# Patient Record
Sex: FEMALE | Race: OTHER
[De-identification: names, ages, dates, MRNs, and addresses within clinical notes are randomized per-mention and may not be internally consistent; named-entity substitution may affect disease eponyms.]

---

## 2017-01-19 ENCOUNTER — HOSPITAL ENCOUNTER (EMERGENCY)
Dept: HOSPITAL 36 - ER | Age: 67
Discharge: HOME | End: 2017-01-19
Payer: MEDICAID

## 2017-01-19 VITALS — SYSTOLIC BLOOD PRESSURE: 205 MMHG | DIASTOLIC BLOOD PRESSURE: 92 MMHG

## 2017-01-19 DIAGNOSIS — E78.5: ICD-10-CM

## 2017-01-19 DIAGNOSIS — E11.65: ICD-10-CM

## 2017-01-19 DIAGNOSIS — I10: ICD-10-CM

## 2017-01-19 DIAGNOSIS — N39.0: Primary | ICD-10-CM

## 2017-01-19 DIAGNOSIS — E78.00: ICD-10-CM

## 2017-01-19 LAB
ANION GAP SERPL CALC-SCNC: 5.2 MMOL/L (ref 7–16)
BACTERIA #/AREA URNS HPF: (no result) /HPF
BASOPHILS NFR BLD AUTO: 0.6 % (ref 0–2)
BILIRUB UR-MCNC: NEGATIVE MG/DL
BUN SERPL-MCNC: 11 MG/DL (ref 7–25)
BUN/CREAT SERPL: 15.7
CALCIUM SERPL-MCNC: 9 MG/DL (ref 8.6–10.3)
CHLORIDE SERPL-SCNC: 97 MEQ/L (ref 98–107)
CO2 SERPL-SCNC: 26.9 MEQ/L (ref 21–31)
COLOR UR: YELLOW
CREAT SERPL-MCNC: 0.7 MG/DL (ref 0.6–1.2)
EOSINOPHIL NFR BLD AUTO: 0.7 % (ref 0–5)
EPI CELLS URNS QL MICRO: (no result) /LPF
ERYTHROCYTE [DISTWIDTH] IN BLOOD BY AUTOMATED COUNT: 12.9 % (ref 11.5–20)
GLUCOSE SERPL-MCNC: 259 MG/DL (ref 70–105)
GLUCOSE UR STRIP-MCNC: 100 MG/DL
HCT VFR BLD CALC: 34.2 % (ref 35–45)
HGB BLD-MCNC: 11.5 GM/DL (ref 11.7–16.1)
HGB BLD-MCNC: 12 G/DL (ref 4–35)
KETONES UR STRIP-MCNC: NEGATIVE MG/DL
LYMPHOCYTES NFR BLD AUTO: 17.2 % (ref 20–50)
MAGNESIUM SERPL-MCNC: 1.6 MG/DL (ref 1.9–2.7)
MCH RBC QN AUTO: 28.4 PG (ref 27–31)
MCHC RBC AUTO-ENTMCNC: 33.6 PG (ref 28–36)
MCV RBC AUTO: 84.5 FL (ref 81–100)
MONOCYTES NFR BLD AUTO: 4.9 % (ref 2–10)
NEUTROPHILS # BLD: 6.6 TH/CMM (ref 1.8–8)
NEUTROPHILS NFR BLD AUTO: 76.6 % (ref 40–80)
PH UR STRIP: 6.5 [PH]
PLATELET # BLD: 324 TH/CMM (ref 150–400)
PMV BLD AUTO: 8.5 FL
POTASSIUM SERPL-SCNC: 4.1 MEQ/L (ref 3.5–5.1)
PROT UR STRIP-MCNC: 100 MG/DL
RBC # BLD AUTO: 4.04 MIL/CMM (ref 3.8–5.2)
RBC # UR STRIP: (no result) /UL
RBC #/AREA URNS HPF: (no result) /HPF (ref 0–5)
SODIUM SERPL-SCNC: 125 MEQ/L (ref 136–145)
UROBILINOGEN UR STRIP-ACNC: 0.2 E.U./DL (ref 0.2–1)
WBC # BLD AUTO: 8.7 TH/CMM (ref 4.8–10.8)
WBC #/AREA URNS HPF: (no result) /HPF (ref 0–5)

## 2017-01-19 PROCEDURE — Z7610: HCPCS

## 2017-01-19 PROCEDURE — Z7502: HCPCS

## 2017-01-19 NOTE — ED PHYSICIAN CHART
Chief Complaint/HPI





- Patient Information


Date Seen:: 01/19/17


Time Seen:: 11:00


Chief Complaint:: dysuria and hematuria


History of Present Illness:: 





onset this am of dysuria and slight hematuria.  No chills, fever, back pain.


Allergies:: 


 Allergies











Allergy/AdvReac Type Severity Reaction Status Date / Time


 


MDX No Known Allergies - Nka Allergy   Verified 01/19/17 10:57





[No Known Allergies - Nka]     











Vitals:: 


 Vital Signs - 8 hr











  01/19/17 01/19/17





  10:40 10:57


 


Temp 97.2 F 


 


HR 77 


 


RR 16 


 


/92 205/92


 


O2 Sat % 100 











Historian:: Patient


Review:: Nurse's Note Reviewed





Review of Systems





- Review of Systems


General/Constitutional: No fever, No chills


Skin: No skin lesions


Head: No headache


Eyes: No loss of vision


ENT: No earache


Neck: No neck pain


Cardio Vascular: No chest pain


Pulmonary: No SOB


GI: No nausea, No vomiting


G/U: Dysuria, Hematuria


Musculoskeletal: No bone or joint pain, No back pain, No muscle pain


Psychiatric: No prior psych history, No depression


Hematopoietic: No bruising, No lymphadenopathy


Allergic/Immuno: No urticaria


Neurological: No syncope





Past Medical History





- Past Medical History


Past Medical History: HTN, DM, Dyslipidemia, Other (hypercholesterolemia)


Family History: None


Social History: Non Smoker, No Alcohol


Surgical History: other (ovarian cyst)


Psychiatricy History: None


Medication: Reviewed





Family Medical History





- Family Member


  ** Mother


History Unknown: Yes





Physical Exam





- Physical Examination


General/Constitutional: Well-developed, well-nourished, Alert, No distress


Head: Atraumatic


Eyes: Lids, conjuctiva normal, PERRL


Skin: Nl inspection, No rash, No skin lesions, No ecchymosis


ENMT: External ears, nose nl, TM canals nl


Neck: No nuchal rigidity


Respiratory: Nl effort/Exclusion, Clear to Auscultation, No Wheeze/Rhonchi/Rales


GI: No tenderness/rebounding/guarding


: No CVA tenderness


Extremities: No tenderness or effusion, Normal digits & nails


Neuro/Psych: Alert/oriented, No focal deficits


Misc: Normal back





Labs/Radiology/EKG Results





- Lab Results


Results: 


 Laboratory Tests











  01/19/17





  10:25


 


Urine Source  MIDSTREAM


 


Urine Color  YELLOW


 


Urine Clarity  SL. CLOUDY


 


Urine pH  6.5


 


Ur Specific Gravity  1.010


 


Urine Protein  100 H


 


Urine Glucose (UA)  100 H


 


Urine Ketones  NEGATIVE


 


Urine Blood  LARGE H


 


Urine Nitrate  NEGATIVE


 


Urine Bilirubin  NEGATIVE


 


Urine Urobilinogen  0.2


 


Ur Leukocyte Esterase  LARGE H











Comments:: 





 Laboratory Results - last 24 hr











  01/19/17 01/19/17 01/19/17





  10:25 11:35 11:35


 


WBC   8.7 


 


RBC   4.04 


 


Hgb   11.5 L 


 


Hct   34.2 L 


 


MCV   84.5 


 


MCH   28.4 


 


MCHC Differential   33.6 


 


RDW   12.9 


 


Plt Count   324 


 


MPV   8.5 


 


Neutrophils %   76.6 


 


Lymphocytes %   17.2 L 


 


Monocytes %   4.9 


 


Eosinophils %   0.7 


 


Basophils %   0.6 


 


Sodium    125 L


 


Potassium    4.1


 


Chloride    97 L


 


Carbon Dioxide    26.9


 


Anion Gap    5.2 L


 


BUN    11


 


Creatinine    0.7


 


Est GFR ( Amer)    > 60.0


 


Est GFR (Non-Af Amer)    > 60.0


 


BUN/Creatinine Ratio    15.7


 


Glucose    259 H


 


Hemoglobin A1c %   


 


Calcium    9.0


 


Magnesium    1.6 L


 


Urine Source  MIDSTREAM  


 


Urine Color  YELLOW  


 


Urine Clarity  SL. CLOUDY  


 


Urine pH  6.5  


 


Ur Specific Gravity  1.010  


 


Urine Protein  100 H  


 


Urine Glucose (UA)  100 H  


 


Urine Ketones  NEGATIVE  


 


Urine Blood  LARGE H  


 


Urine Nitrate  NEGATIVE  


 


Urine Bilirubin  NEGATIVE  


 


Urine Urobilinogen  0.2  


 


Ur Leukocyte Esterase  LARGE H  


 


Urine RBC  15-20  


 


Urine WBC   H  


 


Ur Epithelial Cells  FEW  


 


Urine Bacteria  OCCASIONAL  














  01/19/17





  11:35


 


WBC 


 


RBC 


 


Hgb 


 


Hct 


 


MCV 


 


MCH 


 


MCHC Differential 


 


RDW 


 


Plt Count 


 


MPV 


 


Neutrophils % 


 


Lymphocytes % 


 


Monocytes % 


 


Eosinophils % 


 


Basophils % 


 


Sodium 


 


Potassium 


 


Chloride 


 


Carbon Dioxide 


 


Anion Gap 


 


BUN 


 


Creatinine 


 


Est GFR ( Amer) 


 


Est GFR (Non-Af Amer) 


 


BUN/Creatinine Ratio 


 


Glucose 


 


Hemoglobin A1c %  13.3 H


 


Calcium 


 


Magnesium 


 


Urine Source 


 


Urine Color 


 


Urine Clarity 


 


Urine pH 


 


Ur Specific Gravity 


 


Urine Protein 


 


Urine Glucose (UA) 


 


Urine Ketones 


 


Urine Blood 


 


Urine Nitrate 


 


Urine Bilirubin 


 


Urine Urobilinogen 


 


Ur Leukocyte Esterase 


 


Urine RBC 


 


Urine WBC 


 


Ur Epithelial Cells 


 


Urine Bacteria 














Assessment





- Assessment


General Assessment: 





without a fever or an elevated WBC count patient can be treated as an 

outpatient for her UTI.





ED Septic Shock





- .


Is Septic Shock (SBP<90, OR Lactate>4 mmol\L) present?: No





- <6hrs of presentation:


Vital Signs: 


 Vital Signs - 8 hr











  01/19/17 01/19/17





  10:40 10:57


 


Temp 97.2 F 


 


HR 77 


 


RR 16 


 


/92 205/92


 


O2 Sat % 100 














Reassessment (Disposition)





- Reassessment


Reassessment Condition:: Unchanged





- Diagnosis


Diagnosis:: 





UTI; hyperglycemia; diabetes; hypertension





- Aftercare/Follow up Instructions


Aftercare/Follow-Up Instructions:: Refer to Discharge Instructions


Medication Prescribed:: 





cipro 500 mg Sig 1 BID for one week; pyridium 100 mg #12 Sig 1 TID





- Patient Disposition


Discharge/Transfer:: Home


Condition at Disposition:: Stable, Unchanged

## 2018-01-21 ENCOUNTER — HOSPITAL ENCOUNTER (INPATIENT)
Dept: HOSPITAL 36 - ER | Age: 68
LOS: 2 days | Discharge: HOME | DRG: 194 | End: 2018-01-23
Attending: FAMILY MEDICINE | Admitting: FAMILY MEDICINE
Payer: MEDICAID

## 2018-01-21 DIAGNOSIS — I11.0: Primary | ICD-10-CM

## 2018-01-21 DIAGNOSIS — E11.9: ICD-10-CM

## 2018-01-21 DIAGNOSIS — Z79.84: ICD-10-CM

## 2018-01-21 DIAGNOSIS — E78.5: ICD-10-CM

## 2018-01-21 DIAGNOSIS — R00.2: ICD-10-CM

## 2018-01-21 DIAGNOSIS — I50.9: ICD-10-CM

## 2018-01-21 DIAGNOSIS — I45.10: ICD-10-CM

## 2018-01-21 DIAGNOSIS — E03.9: ICD-10-CM

## 2018-01-21 DIAGNOSIS — I49.3: ICD-10-CM

## 2018-01-21 DIAGNOSIS — N39.0: ICD-10-CM

## 2018-01-21 DIAGNOSIS — E87.6: ICD-10-CM

## 2018-01-21 DIAGNOSIS — E87.1: ICD-10-CM

## 2018-01-21 LAB
ALBUMIN SERPL-MCNC: 4.7 GM/DL (ref 3.7–5.3)
ALBUMIN/GLOB SERPL: 1.6 {RATIO} (ref 1–1.8)
ALP SERPL-CCNC: 88 U/L (ref 34–104)
ALT SERPL-CCNC: 9 U/L (ref 7–52)
ANION GAP SERPL CALC-SCNC: 16.3 MMOL/L (ref 7–16)
APPEARANCE UR: CLEAR
AST SERPL-CCNC: 17 U/L (ref 13–39)
BACTERIA #/AREA URNS HPF: (no result) /HPF
BASOPHILS # BLD AUTO: 0.1 TH/CUMM (ref 0–0.2)
BASOPHILS NFR BLD AUTO: 1.1 % (ref 0–2)
BILIRUB SERPL-MCNC: 0.5 MG/DL (ref 0.3–1)
BILIRUB UR-MCNC: NEGATIVE MG/DL
BUN SERPL-MCNC: 12 MG/DL (ref 7–25)
CALCIUM SERPL-MCNC: 8.9 MG/DL (ref 8.6–10.3)
CHLORIDE SERPL-SCNC: 97 MEQ/L (ref 98–107)
CO2 SERPL-SCNC: 21.1 MEQ/L (ref 21–31)
COLOR UR: YELLOW
CREAT SERPL-MCNC: 0.8 MG/DL (ref 0.6–1.2)
EOSINOPHIL # BLD AUTO: 0 TH/CMM (ref 0.1–0.4)
EOSINOPHIL NFR BLD AUTO: 1 % (ref 0–5)
EPI CELLS URNS QL MICRO: (no result) /LPF
ERYTHROCYTE [DISTWIDTH] IN BLOOD BY AUTOMATED COUNT: 14.2 % (ref 11.5–20)
GLOBULIN SER-MCNC: 3 GM/DL
GLUCOSE SERPL-MCNC: 110 MG/DL (ref 70–105)
GLUCOSE UR STRIP-MCNC: NEGATIVE MG/DL
HCT VFR BLD CALC: 33 % (ref 41–60)
HGB BLD-MCNC: 10.7 GM/DL (ref 12–16)
KETONES UR STRIP-MCNC: NEGATIVE MG/DL
LEUKOCYTE ESTERASE UR-ACNC: (no result)
LYMPHOCYTE AB SER FC-ACNC: 1.5 TH/CMM (ref 1.5–3)
LYMPHOCYTES NFR BLD AUTO: 32.4 % (ref 20–50)
MCH RBC QN AUTO: 28.2 PG (ref 27–31)
MCHC RBC AUTO-ENTMCNC: 28.7 PG (ref 28–36)
MCV RBC AUTO: 88.2 FL (ref 81–100)
MICRO URNS: YES
MONOCYTES # BLD AUTO: 0.4 TH/CMM (ref 0.3–1)
MONOCYTES NFR BLD AUTO: 8.2 % (ref 2–10)
NEUTROPHILS # BLD: 2.8 TH/CMM (ref 1.8–8)
NEUTROPHILS NFR BLD AUTO: 57.3 % (ref 40–80)
NITRITE UR QL STRIP: NEGATIVE
PH UR STRIP: 6 [PH] (ref 4.6–8)
PLATELET # BLD: 309 TH/CMM (ref 150–400)
PMV BLD AUTO: 9.1 FL
POTASSIUM SERPL-SCNC: 3.4 MEQ/L (ref 3.5–5.1)
PROT UR STRIP-MCNC: NEGATIVE MG/DL
RBC # BLD AUTO: 3.74 MIL/CMM (ref 3.8–5.2)
RBC # UR STRIP: NEGATIVE /UL
RBC #/AREA URNS HPF: (no result) /HPF (ref 0–5)
SODIUM SERPL-SCNC: 131 MEQ/L (ref 136–145)
SP GR UR STRIP: <= 1.005 (ref 1–1.03)
URINALYSIS COMPLETE PNL UR: (no result)
UROBILINOGEN UR STRIP-ACNC: 0.2 E.U./DL (ref 0.2–1)
WBC # BLD AUTO: 4.8 TH/CMM (ref 4.8–10.8)
WBC #/AREA URNS HPF: (no result) /HPF (ref 0–5)

## 2018-01-21 PROCEDURE — Z7610: HCPCS

## 2018-01-21 RX ADMIN — POTASSIUM CHLORIDE SCH: 14.9 INJECTION, SOLUTION INTRAVENOUS at 23:39

## 2018-01-21 RX ADMIN — POTASSIUM CHLORIDE SCH: 14.9 INJECTION, SOLUTION INTRAVENOUS at 23:41

## 2018-01-21 NOTE — ED PHYSICIAN CHART
ED Chief Complaint/HPI





- Patient Information


Date Seen:: 01/21/18


Time Seen:: 16:49


Chief Complaint:: High BP


History of Present Illness:: 


66 yo female had palpitation a few hours ago. Her BP was 170s. She had history 

of hypertension and had been compliant with medication.


Allergies:: 


 Allergies











Allergy/AdvReac Type Severity Reaction Status Date / Time


 


MDX No Known Allergies - Nka Allergy   Verified 01/19/17 10:57





[No Known Allergies - Nka]     














ED Review of Systems





- Review of Systems


General/Constitutional: No fever, No chills


Skin: No skin lesions


Head: No headache


ENT: No earache


Neck: No neck pain


Cardio Vascular: Palpitations


Pulmonary: No SOB


GI: No nausea, No vomiting


Musculoskeletal: No bone or joint pain


Neurological: No confusion





ED Past Medical History





- Past Medical History


Past Medical History: HTN, DM, Dyslipidemia, Thyroid disorder (Hypothyroidism)


Social History: Non Smoker, No Alcohol, No Drug Use


Surgical History: other (Ovarian cysts removal)





Family Medical History





- Family Member


  ** Mother


History Unknown: Yes





ED Physical Exam





- Physical Examination


General/Constitutional: Awake, Alert


Head: Atraumatic


Eyes: PERRL, EOMI


Skin: No ecchymosis


ENMT: Nasal exam nl


Neck: No nuchal rigidity


Respiratory: Clear to Auscultation, No Wheeze/Rhonchi/Rales


Cardio Vascular: RRR, No murmur, gallop, rubs, NL S1 S2


GI: No tenderness/rebounding/guarding


Extremities: normal strength in all extremities


Neuro/Psych: No focal deficits





ED Labs/Radiology/EKG Results





- Radiology Results


Results: 


CXR: No focal consolidation





- EKG Interpretations


EKG Time:: 17:17


Rate & Rhythm: Sinus rhythm


Comments:: 


RBBB





ED Assessment





- Assessment


General Assessment: 


Hypertension


CHF


UTI


Hyponatremia


Hypokalemia


Assessment/Comments:: 


CBC, CMP, BNP, Trop I, UA


CXR, EKG


NS 1L IV bolus


Hydralazine


Rocephin


KCL 20mEq PO


Admit to med surg





ED Septic Shock





- .


Is Septic Shock (SBP<90, OR Lactate>4 mmol\L) present?: No





ED Reassessment (Disposition)





- Reassessment


Reassessment Condition:: Improved





- Patient Disposition


Discharge/Transfer:: Acute Care w/in this hosp


Admitting Medical Physician:: Soham Jacobson





ED Discharge Plan





- Patient Disposition


Admit/Discharge/Transfer: Acute Care w/in this hosp


Condition at Disposition: Improved

## 2018-01-22 VITALS — SYSTOLIC BLOOD PRESSURE: 165 MMHG | DIASTOLIC BLOOD PRESSURE: 70 MMHG

## 2018-01-22 LAB
ALBUMIN SERPL-MCNC: 4.5 GM/DL (ref 3.7–5.3)
ALBUMIN/GLOB SERPL: 1.5 {RATIO} (ref 1–1.8)
ALP SERPL-CCNC: 87 U/L (ref 34–104)
ALT SERPL-CCNC: 9 U/L (ref 7–52)
ANION GAP SERPL CALC-SCNC: 12.1 MMOL/L (ref 7–16)
AST SERPL-CCNC: 15 U/L (ref 13–39)
BASOPHILS # BLD AUTO: 0 TH/CUMM (ref 0–0.2)
BILIRUB SERPL-MCNC: 0.5 MG/DL (ref 0.3–1)
BUN SERPL-MCNC: 14 MG/DL (ref 7–25)
CALCIUM SERPL-MCNC: 9.1 MG/DL (ref 8.6–10.3)
CHLORIDE SERPL-SCNC: 100 MEQ/L (ref 98–107)
CHOLEST SERPL-MCNC: 219 MG/DL (ref ?–200)
CO2 SERPL-SCNC: 25.7 MEQ/L (ref 21–31)
CREAT SERPL-MCNC: 0.8 MG/DL (ref 0.6–1.2)
EOSINOPHIL # BLD AUTO: 0 TH/CMM (ref 0.1–0.4)
EOSINOPHIL NFR BLD: 1 % (ref 0–5)
ERYTHROCYTE [DISTWIDTH] IN BLOOD BY AUTOMATED COUNT: 13.8 % (ref 11.5–20)
GLOBULIN SER-MCNC: 3.1 GM/DL
GLUCOSE SERPL-MCNC: 141 MG/DL (ref 70–105)
HBA1C MFR BLD: 10 % (ref 4–6)
HCT VFR BLD CALC: 34.3 % (ref 41–60)
HDLC SERPL-MCNC: 43 MG/DL (ref 23–92)
HGB BLD-MCNC: 11.7 GM/DL (ref 12–16)
HGB BLD-MCNC: 13 G/DL (ref 4–35)
LYMPHOCYTE AB SER FC-ACNC: 0.5 TH/CMM (ref 1.5–3)
LYMPHOCYTES # BLD MANUAL: 42 % (ref 20–50)
MAGNESIUM SERPL-MCNC: 1.9 MG/DL (ref 1.9–2.7)
MCH RBC QN AUTO: 29.6 PG (ref 27–31)
MCHC RBC AUTO-ENTMCNC: 34 PG (ref 28–36)
MCV RBC AUTO: 87 FL (ref 81–100)
MONOCYTES # BLD AUTO: 2.5 TH/CMM (ref 0.3–1)
MONOCYTES # BLD MANUAL: 3 % (ref 2–10)
NEUTROPHILS # BLD: 1 TH/CMM (ref 1.8–8)
NEUTROPHILS NFR BLD AUTO: 54 % (ref 40–80)
PLATELET # BLD: 313 TH/CMM (ref 150–400)
PMV BLD AUTO: 8.9 FL
POTASSIUM SERPL-SCNC: 3.8 MEQ/L (ref 3.5–5.1)
RBC # BLD AUTO: 3.94 MIL/CMM (ref 3.8–5.2)
SODIUM SERPL-SCNC: 134 MEQ/L (ref 136–145)
TOTAL CELLS COUNTED BLD: 100
TRIGL SERPL-MCNC: 164 MG/DL (ref ?–150)
WBC # BLD AUTO: 4 TH/CMM (ref 4.8–10.8)

## 2018-01-22 RX ADMIN — ASPIRIN 81 MG SCH MG: 81 TABLET ORAL at 09:40

## 2018-01-22 RX ADMIN — INSULIN ASPART SCH: 100 INJECTION, SOLUTION INTRAVENOUS; SUBCUTANEOUS at 16:46

## 2018-01-22 RX ADMIN — INSULIN ASPART SCH UNITS: 100 INJECTION, SOLUTION INTRAVENOUS; SUBCUTANEOUS at 12:40

## 2018-01-22 RX ADMIN — LEVOTHYROXINE SODIUM SCH MG: 75 TABLET ORAL at 06:52

## 2018-01-22 RX ADMIN — INSULIN ASPART SCH: 100 INJECTION, SOLUTION INTRAVENOUS; SUBCUTANEOUS at 06:46

## 2018-01-22 NOTE — CONSULTATION
DATE OF CONSULTATION:  01/21/2018



HISTORY OF PRESENT ILLNESS:  This 67-year-old female was seen and examined at

the courtesy of Dr. Jacobson.  This patient was admitted here with history of

palpitations.  Denied any history of chest pains.  There was no history of

shortness of breath, no history of PND, no history of orthopnea, and no history

of cough.  No history of fever.  No history of hemoptysis.  No history of

abdominal pain.  No history of nausea and vomiting.  No history of hematemesis. 

No history of melena.  No history of bleeding per rectum.  No history of change

in bowel habits.  No history of swelling over the legs.  No history of

dizziness.  No history of syncope.



PAST MEDICAL HISTORY:  Usual childhood diseases.  No history of rheumatic fever.

 No history of scarlet fever.



FAMILY HISTORY:  Not significant.



SOCIAL HISTORY:  Denies smoking or drinking.



PHYSICAL EXAMINATION:

VITAL SIGNS:  Heart rate was 84, blood pressure was 162/36, temperature 98.2,

and respirations 19.

SKIN:  Normal.

HEAD:  Normocephalic.

EYES:  Conjunctivae pink.  There is no icterus in the eyes.  Pupil reactive to

light.

NECK:  There was no increased jugular venous distention, no thyromegaly, no

lymphadenopathy.  Carotids equal both sides.

CHEST:  Bilaterally symmetrical and moved well with respirations.  Respiratory

movements equal both sides.  Trachea is central.  There is note to percussion. 

Breath sound, few rales.

CARDIOVASCULAR SYSTEM:  PMI not well localized.  There is no pulsation or

thrill.  No parasternal heave.  S1 normal.  S2 physiologic.  There was no S3, no

rub.

ABDOMEN:  Soft, no tenderness, no rigidity, no guarding and no organomegaly. 

Bowel sounds normal.



LABORATORY DATA:  Sodium was 131, potassium 3.4, chloride 97, CO2 21.1, glucose

110, BUN 12, and creatinine 0.8.  Liver function tests normal.  BNP was 167

slightly elevated.  Urinalysis unremarkable.  Troponin was 0.01.  EKG revealed

sinus rhythm, right bundle branch block.



IMPRESSION:  Palpitations, reviewing the rhythm history, there was only one PVC

noted.  Hypertension, diabetes mellitus type 2, hypothyroidism, hyperlipidemia,

possible mild congestive heart failure on the basis of elevated brain

natriuretic peptide.



PLAN:  To get TSH in a.m.  Echocardiogram in a.m.  Lipid profile in a.m. and

repeat EKG in a.m.  To continue on Norvasc 2.5 mg, metformin, Synthroid,

benazepril, Lipitor 10 mg, aspirin 81 mg, and Coreg 6.25 mg b.i.d.  Further

recommendations will be made depending on the rest of the tests available.





DD: 01/22/2018 03:28

DT: 01/22/2018 05:19

[facility]# [id number]  [id number]

## 2018-01-22 NOTE — HISTORY AND PHYSICAL
History of Present Illness





- HPI


Chief Complaint: 





palpitations with elevated BP


HPI: 





67 year old female who presents to [facility] with 

palpitations and accelerated HTN which was noted in the 170's while in the ER. 

Patient has a history of HTN and is compliant with her medication. PMH included 

HTN,DM,Dyslipidemia, thyroid disorder.





While in the ER patient was noted to have a RBBB.





Initial Labs 





WBC 4.8 H/H 10.7/33 plat 309K


Na 131 K 3.4 BUN/Cr 12/0.9 glu 110








 Trig 104  HDL 43





Patient was subsequently admitted for further evaluation and treatment. 


Vital Signs: 





 Last Vital Signs











Temp  97.6 F   01/22/18 04:12


 


Pulse  79   01/22/18 04:12


 


Resp  20   01/22/18 04:12


 


BP  121/76   01/22/18 04:12


 


Pulse Ox  99   01/22/18 04:12














Family Medical History





- Family Member


  ** Mother


History Unknown: Yes


Ethnicity: 





Social History


Smoke: No


Alcohol: None


Drugs: None


Lives: With Family





- Medications


Home Medications: 


Home Medication











 Medication  Instructions  Recorded  Type


 


Benazepril HCl [Lotensin*] 40 mg PO DAILY 05/13/15 History


 


Glimepiride [Amaryl*] 4 mg PO BID 05/13/15 History


 


metFORMIN [Glucophage] 850 mg PO TID 05/13/15 History


 


Amlodipine Besylate 2.5 mg PO DAILY 01/21/18 History


 


Atorvastatin Calcium [Lipitor] 40 mg PO HS 01/21/18 History


 


Levothyroxine [Synthroid] 0.075 mg PO QDAC 01/21/18 History


 


Pioglitazone HCl [Actos] 15 mg PO DAILY 01/21/18 History














- Allergies


Allergies/Adverse Reactions: 


 Allergies











Allergy/AdvReac Type Severity Reaction Status Date / Time


 


No Known Allergies Allergy   Verified 01/21/18 17:04














Review of Systems





- Review of Systems


Constitutional: Report: No Significant


Eyes: Report: No Significant


ENT: Report: No Significant


Respiratory: Report: No Significant


Cardiovascular: Report: Palpitations


Gastrointestinal: Report: No Significant


Genitourinary: Report: No Significant


Musculoskeletal: Report: No Significant


Skin: Report: No Significant


Neurological: Report: No Significant





Physical Exam





- Physical Exam


HEENT: Report: Ears Nose Throat within normal limits, Pharnyx within normal 

limits


Neck: Report: Within normal limits.  Denies: Thyromegaly


Cardiovascular Systems: Report: +s1/s2 noted, Irregular rhythm was noted


Respiratory: Report: Breath Sounds are within normal limits, Clear to 

Auscultation of lung fields


Abdomen: Report: Non-tender to palpation


Back: Report: Inspection of back is within normal limits.


Extremities: Report: Non-tender to palpation.


Skin: Report: Color of skin is within normal limits, Warm


Neuro/Psych: Report: Mood affect is within normal limits, A+Ox3





- Lab Results


All Lab Results last 24 hours: 





 Laboratory Results - last 24 hr











  01/22/18 01/22/18 01/22/18





  05:10 05:10 05:10


 


WBC  4.0 L  


 


RBC  3.94  


 


Hgb  11.7 L  


 


Hct  34.3 L  


 


MCV  87.0  


 


MCH  29.6  


 


MCHC Differential  34.0  


 


RDW  13.8  


 


Plt Count  313  


 


MPV  8.9  


 


Neutrophils (Manual)  54  


 


Lymphocytes  42  


 


Monocytes  3  


 


Eosinophils  1  


 


Sodium   134 L 


 


Potassium   3.8 


 


Chloride   100 


 


Carbon Dioxide   25.7 


 


Anion Gap   12.1 


 


BUN   14 


 


Creatinine   0.8 


 


Est GFR ( Amer)   > 60.0 


 


Est GFR (Non-Af Amer)   > 60.0 


 


BUN/Creatinine Ratio   17.5 


 


Glucose   141 H 


 


POC Glucose   


 


Calcium   9.1 


 


Magnesium   1.9 


 


Total Bilirubin   0.5 


 


AST   15 


 


ALT   9 


 


Alkaline Phosphatase   87 


 


B-Natriuretic Peptide   


 


Total Protein   7.6 


 


Albumin   4.5 


 


Globulin   3.1 


 


Albumin/Globulin Ratio   1.5 


 


Triglycerides   164 H 


 


Cholesterol   219 H 


 


LDL Cholesterol Direct   139 


 


HDL Cholesterol   43 


 


TSH    3.16














  01/22/18 01/22/18





  05:50 08:07


 


WBC  


 


RBC  


 


Hgb  


 


Hct  


 


MCV  


 


MCH  


 


MCHC Differential  


 


RDW  


 


Plt Count  


 


MPV  


 


Neutrophils (Manual)  


 


Lymphocytes  


 


Monocytes  


 


Eosinophils  


 


Sodium  


 


Potassium  


 


Chloride  


 


Carbon Dioxide  


 


Anion Gap  


 


BUN  


 


Creatinine  


 


Est GFR ( Amer)  


 


Est GFR (Non-Af Amer)  


 


BUN/Creatinine Ratio  


 


Glucose  


 


POC Glucose  130 H 


 


Calcium  


 


Magnesium  


 


Total Bilirubin  


 


AST  


 


ALT  


 


Alkaline Phosphatase  


 


B-Natriuretic Peptide   197.0 H


 


Total Protein  


 


Albumin  


 


Globulin  


 


Albumin/Globulin Ratio  


 


Triglycerides  


 


Cholesterol  


 


LDL Cholesterol Direct  


 


HDL Cholesterol  


 


TSH  














- Assessment


Assessment: 





 Current Active Problems











Problem Status Onset


 


PALPATATIONS WITH ELEVATED BLOOD PRESSUR Acute  








Palpitations


RBBB


HTN


Hyperlipidemia


DM


Thyroid disorder


elevated BNP


hypokalemia


Hyponatremia








- Plan


Plan: 





cardiac consult


continue home meds


ECHO


Kcl 40mg PO daily

## 2018-01-22 NOTE — DIAGNOSTIC IMAGING REPORT
CHEST X-RAY: AP view



INDICATION: Shortness of breath



COMPARISON: None



FINDINGS: Mild chronic changes seen with no focal consolidation or

effusions.  Heart size is normal.  There is minimal atherosclerosis of

the aortic arch.  Postsurgical changes of the base of the neck are

noted.  Degenerative changes of the spine are noted.



IMPRESSION:



No focal airspace consolidation identified.

## 2018-01-23 LAB
ALBUMIN SERPL-MCNC: 4.2 GM/DL (ref 3.7–5.3)
ALBUMIN/GLOB SERPL: 1.6 {RATIO} (ref 1–1.8)
ALP SERPL-CCNC: 77 U/L (ref 34–104)
ALT SERPL-CCNC: 8 U/L (ref 7–52)
ANION GAP SERPL CALC-SCNC: 12.2 MMOL/L (ref 7–16)
AST SERPL-CCNC: 14 U/L (ref 13–39)
BASOPHILS # BLD AUTO: 0 TH/CUMM (ref 0–0.2)
BILIRUB SERPL-MCNC: 0.5 MG/DL (ref 0.3–1)
BUN SERPL-MCNC: 27 MG/DL (ref 7–25)
CALCIUM SERPL-MCNC: 9.2 MG/DL (ref 8.6–10.3)
CHLORIDE SERPL-SCNC: 99 MEQ/L (ref 98–107)
CO2 SERPL-SCNC: 25.6 MEQ/L (ref 21–31)
CREAT SERPL-MCNC: 0.9 MG/DL (ref 0.6–1.2)
EOSINOPHIL # BLD AUTO: 0 TH/CMM (ref 0.1–0.4)
EOSINOPHIL NFR BLD: 5 % (ref 0–5)
ERYTHROCYTE [DISTWIDTH] IN BLOOD BY AUTOMATED COUNT: 14.1 % (ref 11.5–20)
GLOBULIN SER-MCNC: 2.7 GM/DL
GLUCOSE SERPL-MCNC: 131 MG/DL (ref 70–105)
HCT VFR BLD CALC: 33 % (ref 41–60)
HGB BLD-MCNC: 11.2 GM/DL (ref 12–16)
LYMPHOCYTE AB SER FC-ACNC: 0.3 TH/CMM (ref 1.5–3)
LYMPHOCYTES # BLD MANUAL: 57 % (ref 20–50)
MCH RBC QN AUTO: 29.4 PG (ref 27–31)
MCHC RBC AUTO-ENTMCNC: 33.9 PG (ref 28–36)
MCV RBC AUTO: 86.9 FL (ref 81–100)
MONOCYTES # BLD AUTO: 2.8 TH/CMM (ref 0.3–1)
MONOCYTES # BLD MANUAL: 5 % (ref 2–10)
NEUTROPHILS # BLD: 0.7 TH/CMM (ref 1.8–8)
NEUTROPHILS NFR BLD AUTO: 33 % (ref 40–80)
PLATELET # BLD: 314 TH/CMM (ref 150–400)
PMV BLD AUTO: 8.7 FL
POTASSIUM SERPL-SCNC: 3.8 MEQ/L (ref 3.5–5.1)
RBC # BLD AUTO: 3.8 MIL/CMM (ref 3.8–5.2)
SODIUM SERPL-SCNC: 133 MEQ/L (ref 136–145)
TOTAL CELLS COUNTED BLD: 100
WBC # BLD AUTO: 3.8 TH/CMM (ref 4.8–10.8)

## 2018-01-23 RX ADMIN — LEVOTHYROXINE SODIUM SCH MG: 75 TABLET ORAL at 06:51

## 2018-01-23 RX ADMIN — INSULIN ASPART SCH: 100 INJECTION, SOLUTION INTRAVENOUS; SUBCUTANEOUS at 01:34

## 2018-01-23 RX ADMIN — ASPIRIN 81 MG SCH MG: 81 TABLET ORAL at 08:32

## 2018-01-23 RX ADMIN — INSULIN ASPART SCH: 100 INJECTION, SOLUTION INTRAVENOUS; SUBCUTANEOUS at 07:31

## 2018-01-23 NOTE — CARDIOLOGY
01/22/2018



A patient of Dr. Sae Jacobson.



M-MODE ECHOCARDIOGRAM:  Mitral valve, anterior leaflet of mitral valve shows

normal excursion, EF velocity.  Posterior leaflet of mitral valve shows normal

excursion.  Left ventricular posterior wall shows increased thickness, normal

excursion.  Interventricular septum shows increased thickness, normal excursion,

hypertrophy of the left ventricle, ejection fraction 60%.  Left atrium normal. 

Aortic root shows normal dimension, normal excursion of aortic leaflets.



CONCLUSION:  Hypertrophy of the left ventricle, ejection fraction 60%.



2D ECHO:  Long axis view showed normal sized left ventricle with hypertrophy of

the left ventricle.  Left atrium normal.  Aortic root shows normal dimension,

normal excursion of aortic leaflets.  Short axis view of mitral valve normal. 

Short axis view of aortic valve normal.  Apical four-chamber view showed normal

sized left ventricle with hypertrophy of the left ventricle, left atrium normal,

right ventricular cavity and right atrium normal, no pericardial effusion.



CONCLUSION:  Hypertrophy of the left ventricle, ejection fraction 60%.  Doppler

study showed trace tricuspid regurgitation, mitral regurgitation, pulmonary

regurgitation, aortic regurgitation.  Right ventricular systolic pressure 30

mmHg.





DD: 01/23/2018 12:55

DT: 01/23/2018 14:16

[facility]# [id number]  [id number]

## 2018-01-23 NOTE — GENERAL PROGRESS NOTE
Subjective





- Review of Systems


Service Date: 01/23/18


Subjective: 





Awake,alert,afebrile...feeling much better. denies chest pain, palpitations. 





Objective





- Results


Result Diagrams: 


 01/23/18 05:00





 01/23/18 05:00


Recent Labs: 


 Laboratory Last Values











WBC  3.8 Th/cmm (4.8-10.8)  L  01/23/18  05:00    


 


RBC  3.80 Mil/cmm (3.80-5.20)   01/23/18  05:00    


 


Hgb  11.2 gm/dL (12-16)  L  01/23/18  05:00    


 


Hct  33.0 % (41.0-60)  L  01/23/18  05:00    


 


MCV  86.9 fl ()   01/23/18  05:00    


 


MCH  29.4 pg (27.0-31.0)   01/23/18  05:00    


 


MCHC Differential  33.9 pg (28.0-36.0)   01/23/18  05:00    


 


RDW  14.1 % (11.5-20.0)   01/23/18  05:00    


 


Plt Count  314 Th/cmm (150-400)   01/23/18  05:00    


 


MPV  8.7 fl  01/23/18  05:00    


 


Neutrophils %  57.3 % (40.0-80.0)   01/21/18  17:17    


 


Lymphocytes %  32.4 % (20.0-50.0)   01/21/18  17:17    


 


Monocytes %  8.2 % (2.0-10.0)   01/21/18  17:17    


 


Eosinophils %  1.0 % (0.0-5.0)   01/21/18  17:17    


 


Basophils %  1.1 % (0.0-2.0)   01/21/18  17:17    


 


Neutrophils (Manual)  33 % (40-80)  L  01/23/18  05:00    


 


Lymphocytes  57 % (20-50)  H  01/23/18  05:00    


 


Monocytes  5 % (2-10)   01/23/18  05:00    


 


Eosinophils  5 % (0-5)   01/23/18  05:00    


 


Sodium  133 mEq/L (136-145)  L  01/23/18  05:00    


 


Potassium  3.8 mEq/L (3.5-5.1)   01/23/18  05:00    


 


Chloride  99 mEq/L ()   01/23/18  05:00    


 


Carbon Dioxide  25.6 mEq/L (21.0-31.0)   01/23/18  05:00    


 


Anion Gap  12.2  (7.0-16.0)   01/23/18  05:00    


 


BUN  27 mg/dL (7-25)  H  01/23/18  05:00    


 


Creatinine  0.9 mg/dL (0.6-1.2)   01/23/18  05:00    


 


Est GFR ( Amer)  > 60.0 ml/min (>90)   01/23/18  05:00    


 


Est GFR (Non-Af Amer)  > 60.0 ml/min  01/23/18  05:00    


 


BUN/Creatinine Ratio  30.0   01/23/18  05:00    


 


Glucose  131 mg/dL ()  H  01/23/18  05:00    


 


POC Glucose  131 MG/DL (70 - 105)  H  01/23/18  06:09    


 


Hemoglobin A1c %  10.0 % (4.0-6.0)  H  01/22/18  05:10    


 


Calcium  9.2 mg/dL (8.6-10.3)   01/23/18  05:00    


 


Magnesium  1.9 mg/dL (1.9-2.7)   01/22/18  05:10    


 


Total Bilirubin  0.5 mg/dL (0.3-1.0)   01/23/18  05:00    


 


AST  14 U/L (13-39)   01/23/18  05:00    


 


ALT  8 U/L (7-52)   01/23/18  05:00    


 


Alkaline Phosphatase  77 U/L ()   01/23/18  05:00    


 


Troponin I  < 0.01 ng/mL (0.01-0.05)  L  01/21/18  17:17    


 


B-Natriuretic Peptide  52.3 pg/mL (5.0-100.0)   01/23/18  05:00    


 


Total Protein  6.9 gm/dL (6.0-8.3)   01/23/18  05:00    


 


Albumin  4.2 gm/dL (3.7-5.3)   01/23/18  05:00    


 


Globulin  2.7 gm/dL  01/23/18  05:00    


 


Albumin/Globulin Ratio  1.6  (1.0-1.8)   01/23/18  05:00    


 


Triglycerides  164 mg/dL (<150)  H  01/22/18  05:10    


 


Cholesterol  219 mg/dL (<200)  H  01/22/18  05:10    


 


LDL Cholesterol Direct  139 mg/dL ()   01/22/18  05:10    


 


HDL Cholesterol  43 mg/dL (23-92)   01/22/18  05:10    


 


TSH  3.16 uIU/ml (0.34-5.60)   01/22/18  05:10    


 


Urine Source  RANDOM   01/21/18  16:48    


 


Urine Color  YELLOW   01/21/18  16:48    


 


Urine Clarity  CLEAR  (CLEAR)   01/21/18  16:48    


 


Urine pH  6.0  (4.6 - 8.0)   01/21/18  16:48    


 


Ur Specific Gravity  <= 1.005  (1.005-1.030)   01/21/18  16:48    


 


Urine Protein  NEGATIVE mg/dL (NEGATIVE)   01/21/18  16:48    


 


Urine Glucose (UA)  NEGATIVE mg/dL (NEGATIVE)   01/21/18  16:48    


 


Urine Ketones  NEGATIVE mg/dL (NEGATIVE)   01/21/18  16:48    


 


Urine Blood  NEGATIVE  (NEGATIVE)   01/21/18  16:48    


 


Urine Nitrate  NEGATIVE  (NEGATIVE)   01/21/18  16:48    


 


Urine Bilirubin  NEGATIVE  (NEGATIVE)   01/21/18  16:48    


 


Urine Urobilinogen  0.2 E.U./dL (0.2 - 1.0)   01/21/18  16:48    


 


Ur Leukocyte Esterase  TRACE  (NEGATIVE)  H  01/21/18  16:48    


 


Urine RBC  NONE SEEN /hpf (0-5)   01/21/18  16:48    


 


Urine WBC  0-2 /hpf (0-5)   01/21/18  16:48    


 


Ur Epithelial Cells  NONE SEEN /lpf (FEW)   01/21/18  16:48    


 


Urine Bacteria  NONE SEEN /hpf (NONE SEEN)   01/21/18  16:48    














- Physical Exam


Vitals and I&O: 


 Vital Signs











Temp  97.6 F   01/23/18 07:59


 


Pulse  73   01/23/18 08:34


 


Resp  17   01/23/18 07:59


 


BP  100/60   01/23/18 08:34


 


Pulse Ox  96   01/23/18 07:59








 Intake & Output











 01/22/18 01/23/18 01/23/18





 18:59 06:59 18:59


 


Intake Total 850 50 


 


Balance 850 50 


 


Weight (lbs) 50.802 kg 52.163 kg 


 


Intake:   


 


  Oral 850 50 


 


Other:   


 


  # Voids 3 2 


 


  # Bowel Movements 1 0 











Active Medications: 


Current Medications





Amlodipine Besylate (Norvasc)  2.5 mg PO DAILY [facility]


   Stop: 03/23/18 08:59


   Last Admin: 01/23/18 08:33 Dose:  Not Given


Aspirin (Aspirin Chewable)  81 mg PO DAILY [facility]


   Stop: 03/23/18 08:59


   Last Admin: 01/23/18 08:32 Dose:  81 mg


Atorvastatin Calcium (Lipitor)  10 mg PO HS [facility]


   Stop: 03/23/18 20:59


   Last Admin: 01/22/18 21:39 Dose:  10 mg


Benazepril HCl (Lotensin)  40 mg PO DAILY [facility]


   Stop: 03/23/18 08:59


   Last Admin: 01/23/18 08:33 Dose:  Not Given


Carvedilol (Coreg)  6.25 mg PO BID [facility]


   Stop: 03/23/18 08:59


   Last Admin: 01/23/18 08:34 Dose:  6.25 mg


Glimepiride (Amaryl)  4 mg PO BID [facility]


   Stop: 03/23/18 08:59


   Last Admin: 01/23/18 08:31 Dose:  4 mg


Heparin Sodium (Porcine) (Heparin)  5,000 units SUBQ Q12HR [facility]


   Stop: 03/23/18 20:59


   Last Admin: 01/23/18 08:34 Dose:  5,000 units


Insulin Aspart (Novolog Insulin Sliding Scale)  0 units SUBQ ACHS [facility]


   PRN Reason: Protocol


   Stop: 03/23/18 07:29


   Last Admin: 01/23/18 07:31 Dose:  Not Given


Levothyroxine Sodium (Synthroid)  0.075 mg PO QDAC [facility]


   Stop: 03/23/18 07:29


   Last Admin: 01/23/18 06:51 Dose:  0.075 mg


Metformin HCl (Glucophage)  850 mg PO TIDWM [facility]


   Stop: 03/23/18 07:59


   Last Admin: 01/23/18 08:30 Dose:  850 mg


Pioglitazone HCl (Actos)  15 mg PO DAILY [facility]


   Stop: 03/23/18 08:59


   Last Admin: 01/23/18 08:32 Dose:  15 mg








General: Alert, Oriented x3, No acute distress


HEENT: Atraumatic, PERRLA, EOMI


Neck: Supple, no JVD, no Thyromegaly


Cardiovascular: Regular rate, Normal S1, Normal S2


Lungs: Clear to auscultation


Abdomen: Bowel sounds, Soft, no Distended


Extremities: no Clubbing, no Cyanosis, no Edema





Assessment/Plan





- Problem List


Patient Problems: 


All Active Problems





PALPATATIONS WITH ELEVATED BLOOD PRESSUR (Acute) 


Fever (Acute) R50.9


Head ache (Acute) R51


Urinary tract infection (Acute) 











- Assessment


Assessment: 





 Current Active Problems











Problem Status Onset


 


PALPATATIONS WITH ELEVATED BLOOD PRESSUR Acute  








Palpitations


RBBB


HTN


Hyperlipidemia


DM


Thyroid disorder


elevated BNP


hypokalemia


Hyponatremia








- Plan


Plan: 





cardiac consult


continue home meds


ECHO results pending

## 2018-01-25 NOTE — DISCHARGE SUMMARY
DATE OF DISCHARGE:  01/23/2018



PRELIMINARY DIAGNOSES:

1.  Palpitations.

2.  Congestive heart failure.

3.  Hypertension.

4.  Right bundle branch block.

5.  Hyperlipidemia.

6.  Diabetes mellitus.

7.  Urinary tract infection.



DISCHARGE DIAGNOSES:

1.  Palpitations.

2.  Congestive heart failure.

3.  Hypertension.

4.  Right bundle branch block.

5.  Hyperlipidemia.

6.  Diabetes mellitus.

7.  Urinary tract infection.



HISTORY OF PRESENT ILLNESS:  This is a 67-year-old female, who presents to

[facility] ER with palpitations and accelerated hypertension,

which was noted in 170s systolically while in the ER.  The patient has a history

of hypertension and states that she is compliant with her medicine.  Her past

medical history includes hypertension, diabetes, dyslipidemia, and thyroid

disorder.  While in the ER, the patient was noted to have right bundle branch

block noted on the telemetry strip.  Her initial lab work revealed a white count

of 4.8, hemoglobin of 10.7, hematocrit of 33, platelets of 309,000.  Sodium 131,

potassium 3.4, BUN and creatinine 12/0.9, glucose 110.  Her BNP was 167.  Total

cholesterol 219, triglycerides 104, , and HDL 43.  The patient was then

subsequently admitted for further evaluation and treatment.



HOSPITAL COURSE:  The patient was improved during her hospital stay, was seen

and evaluated by Cardiology and underwent an echocardiogram.  Please see

dictated report.  The patient had a chest x-ray initially that revealed no focal

consolidation or no acute disease was noted.  The patient was then subsequently

discharged in stable condition, was to follow up with her regular physician, and

was to continue her previous medications.





DD: 01/25/2018 10:26

DT: 01/25/2018 21:20

[facility]# [id number]  [id number]